# Patient Record
Sex: MALE | Race: WHITE | NOT HISPANIC OR LATINO | Employment: FULL TIME | URBAN - METROPOLITAN AREA
[De-identification: names, ages, dates, MRNs, and addresses within clinical notes are randomized per-mention and may not be internally consistent; named-entity substitution may affect disease eponyms.]

---

## 2020-07-30 ENCOUNTER — HOSPITAL ENCOUNTER (EMERGENCY)
Facility: HOSPITAL | Age: 30
Discharge: HOME | End: 2020-07-30
Attending: EMERGENCY MEDICINE
Payer: COMMERCIAL

## 2020-07-30 VITALS
SYSTOLIC BLOOD PRESSURE: 118 MMHG | WEIGHT: 187 LBS | BODY MASS INDEX: 24 KG/M2 | DIASTOLIC BLOOD PRESSURE: 87 MMHG | TEMPERATURE: 99 F | HEIGHT: 74 IN | OXYGEN SATURATION: 97 % | HEART RATE: 102 BPM | RESPIRATION RATE: 16 BRPM

## 2020-07-30 DIAGNOSIS — T14.90XA TRAUMA: ICD-10-CM

## 2020-07-30 DIAGNOSIS — S50.11XA TRAUMATIC HEMATOMA OF RIGHT FOREARM, INITIAL ENCOUNTER: Primary | ICD-10-CM

## 2020-07-30 PROCEDURE — 73090 XR FOREARM RIGHT: ICD-10-PCS | Mod: 26,RT,, | Performed by: RADIOLOGY

## 2020-07-30 PROCEDURE — 73090 X-RAY EXAM OF FOREARM: CPT | Mod: 26,RT,, | Performed by: RADIOLOGY

## 2020-07-30 PROCEDURE — 73090 X-RAY EXAM OF FOREARM: CPT | Mod: TC,FY,RT

## 2020-07-30 PROCEDURE — 99283 EMERGENCY DEPT VISIT LOW MDM: CPT | Mod: 25

## 2020-07-30 NOTE — DISCHARGE INSTRUCTIONS
Ice     Motrin     Someone from the hospital will be contacting you in 1-2 business days to make arrangements for follow-up with primary care provider.    Return to the emergency room if symptoms worsen as discussed

## 2020-07-30 NOTE — ED PROVIDER NOTES
"Encounter Date: 7/30/2020       History     Chief Complaint   Patient presents with    Arm Injury     right forearm hematoma     Humberto Zamudio is a 29-year-old male patient with no significant past medical history.  He presents to emergency room with injury to right forearm.    Patient reports that he pulled back on his bow and the arrow broke and struck him in the right forearm approximately 1 hr prior to arrival.    Patient with significant soft tissue swelling to anterior aspect of forearm.  He has a small, superficial abrasion. He reports "burning" sensation to site of injury. No active bleeding.      Patient with full range of motion of right upper extremity.      Distal extremity is neurovascularly intact.    Dr. Sky performed initial triage and ordered x-ray of the right arm.        Review of patient's allergies indicates:  No Known Allergies  History reviewed. No pertinent past medical history.  History reviewed. No pertinent surgical history.  History reviewed. No pertinent family history.  Social History     Tobacco Use    Smoking status: Never Smoker    Smokeless tobacco: Current User     Types: Snuff   Substance Use Topics    Alcohol use: Not on file    Drug use: Not on file     Review of Systems   Constitutional: Negative.    HENT: Negative.    Eyes: Negative.    Respiratory: Negative.    Cardiovascular: Negative.    Gastrointestinal: Negative.    Endocrine: Negative.    Genitourinary: Negative.    Musculoskeletal: Positive for arthralgias (right forearm).   Skin: Positive for wound.   Allergic/Immunologic: Negative.    Neurological: Negative.    Hematological: Negative.    Psychiatric/Behavioral: Negative.    All other systems reviewed and are negative.      Physical Exam     Initial Vitals [07/30/20 0954]   BP Pulse Resp Temp SpO2   118/87 102 16 98.6 °F (37 °C) 97 %      MAP       --         Physical Exam    Nursing note and vitals reviewed.  Constitutional: He appears well-developed and " well-nourished.   Eyes: Conjunctivae are normal.   Neck: Normal range of motion. Neck supple.   Cardiovascular: Normal rate.   Pulmonary/Chest: Breath sounds normal.   Abdominal: Soft. Bowel sounds are normal.   Musculoskeletal: Normal range of motion. Tenderness present.      Right shoulder: Normal.      Right elbow: Normal.     Right wrist: Normal.      Right forearm: He exhibits tenderness and swelling. He exhibits no bony tenderness, no edema, no deformity and no laceration.        Arms:         Right hand: Normal.   Neurological: He is alert and oriented to person, place, and time.   Skin: Skin is warm. Capillary refill takes less than 2 seconds.   Psychiatric: He has a normal mood and affect. His behavior is normal. Judgment and thought content normal.         ED Course   Procedures  Labs Reviewed - No data to display       Imaging Results          X-Ray Forearm Right (Final result)  Result time 07/30/20 10:12:07    Final result by Rhonda Monte MD (07/30/20 10:12:07)                 Impression:      Focal soft tissue swelling without fracture or foreign body.      Electronically signed by: Rhonda Monte  Date:    07/30/2020  Time:    10:12             Narrative:    EXAMINATION:  XR FOREARM RIGHT    CLINICAL HISTORY:  Injury, unspecified, initial encounter    TECHNIQUE:  AP and lateral views of the right forearm were performed.    COMPARISON:  None    FINDINGS:  Two views of the right forearm were obtained demonstrating soft tissue swelling along the volar aspect of the proximal forearm, particularly medially.  There is no fracture or foreign body.  The wrist and elbow are intact.                                 Medical Decision Making:   Initial Assessment:   Patient with injury to right forearm.    Patient reports that he pulled back on his bow and the arrow broke and struck him in the right forearm approximately 1 hr prior to arrival.    Patient with significant soft tissue swelling to anterior  "aspect of forearm.  He has a small, superficial abrasion. He reports "burning" sensation to site of injury. No active bleeding.      Patient with full range of motion of right upper extremity.      Distal extremity is neurovascularly intact.    Dr. Sky performed initial triage and ordered x-ray of the right arm.    Differential Diagnosis:   Hematoma, contusion, bone injury, foreign body, tendon injury  ED Management:  X-ray right forearm with no acute findings    Patient instructed to continue ice and Motrin at home.    Discussed physical exam findings with patient  No acute emergent medical condition identified at this time to warrant further testing/diagnostics  At this time, I believe the patient is clinically stable for discharge.   Patient to follow up with PCP in 1-2 days.  The patient acknowledges that close follow up with a MD is required after all ER visits  Pt given instructions; take all medications prescribed in the ER as directed.   Patient agrees to comply with all instruction and direction given in the ER  Pt agrees to return to ER if any symptoms reoccur                                              Clinical Impression:       ICD-10-CM ICD-9-CM   1. Traumatic hematoma of right forearm, initial encounter  S50.11XA 923.10   2. Trauma  T14.90XA 959.9             ED Disposition Condition    Discharge Stable        ED Prescriptions     None        Follow-up Information     Follow up With Specialties Details Why Contact Info        Close follow-up with primary care doctor in the next 1-2 days.                                     Krys Carter NP  07/30/20 1039    "

## 2020-07-30 NOTE — ED TRIAGE NOTES
Pt states he was shooting his bow and arrow when the arrow broke and snapped back and hit his right forearm causing a large hematoma and bruising with a small abrasion.